# Patient Record
Sex: FEMALE | Race: BLACK OR AFRICAN AMERICAN | NOT HISPANIC OR LATINO | Employment: FULL TIME | URBAN - METROPOLITAN AREA
[De-identification: names, ages, dates, MRNs, and addresses within clinical notes are randomized per-mention and may not be internally consistent; named-entity substitution may affect disease eponyms.]

---

## 2023-08-24 PROBLEM — J30.9 ALLERGIC RHINITIS: Status: ACTIVE | Noted: 2023-08-24

## 2023-08-24 PROBLEM — R63.4 WEIGHT LOSS, UNINTENTIONAL: Status: ACTIVE | Noted: 2023-08-24

## 2023-08-24 PROBLEM — J33.9 NASAL POLYP: Status: ACTIVE | Noted: 2023-08-24

## 2023-08-24 PROBLEM — O99.013 ANEMIA DURING PREGNANCY IN THIRD TRIMESTER (HHS-HCC): Status: ACTIVE | Noted: 2023-08-24

## 2023-08-24 PROBLEM — Z22.330 GBS CARRIER: Status: ACTIVE | Noted: 2023-08-24

## 2023-08-24 PROBLEM — R87.619 ABNORMAL PAP SMEAR OF CERVIX: Status: ACTIVE | Noted: 2023-08-24

## 2023-08-24 PROBLEM — K21.9 GERD (GASTROESOPHAGEAL REFLUX DISEASE): Status: ACTIVE | Noted: 2023-08-24

## 2023-08-24 RX ORDER — OXYCODONE AND ACETAMINOPHEN 5; 325 MG/1; MG/1
1 TABLET ORAL EVERY 6 HOURS PRN
COMMUNITY

## 2023-08-24 RX ORDER — NORETHINDRONE 0.35 MG/1
1 TABLET ORAL DAILY
COMMUNITY

## 2023-08-24 RX ORDER — NORETHINDRONE 0.35 MG/1
1 TABLET ORAL DAILY
COMMUNITY
Start: 2022-06-02

## 2023-10-03 ENCOUNTER — APPOINTMENT (OUTPATIENT)
Dept: PHYSICAL THERAPY | Facility: CLINIC | Age: 33
End: 2023-10-03
Payer: COMMERCIAL

## 2023-10-04 ENCOUNTER — DOCUMENTATION (OUTPATIENT)
Dept: PHYSICAL THERAPY | Facility: CLINIC | Age: 33
End: 2023-10-04
Payer: COMMERCIAL

## 2023-10-04 NOTE — PROGRESS NOTES
Physical Therapy                 Therapy Communication Note    Patient Name: Racheal Stokes  MRN: [id number]  Today's Date: 10/4/2023     Discipline: Physical Therapy    Missed Visit Reason:      Missed Time: Patient Cancel appointment 10/03/2023

## 2023-10-10 ENCOUNTER — DOCUMENTATION (OUTPATIENT)
Dept: PHYSICAL THERAPY | Facility: CLINIC | Age: 33
End: 2023-10-10
Payer: COMMERCIAL

## 2023-10-10 NOTE — PROGRESS NOTES
Physical Therapy                 Therapy Communication Note    Patient Name: Racheal Stokes  MRN: [id number]  Today's Date: 10/10/2023     Discipline: Physical Therapy    Missed Time: No Show    Comment: Patient no-showed appointment. Attempted to reach patient, left voicemail after missed visit. Patients insurance authorization expires 10/13/2023.

## 2024-01-12 ENCOUNTER — DOCUMENTATION (OUTPATIENT)
Dept: PHYSICAL THERAPY | Facility: CLINIC | Age: 34
End: 2024-01-12
Payer: COMMERCIAL

## 2024-01-12 NOTE — PROGRESS NOTES
Physical Therapy    Discharge Summary    Name: Racheal Stokes  MRN: [id number]  : 1990  Date: 24    Discharge Summary: PT    Discharge Information: Date of discharge 2024, Date of last visit 2023, Date of evaluation 2023, Number of attended visits 6, Referred by Divya IVEY, and Referred for R foot fracture    Discharge Status: Patient cancelled, no showed follow up visits prior to end of insurance authorization approval date. Insurance authorization for physical therapy ended 10/13/2023. See last daily note for last known status.    Rehab Discharge Reason: Failed to schedule and/or keep follow-up appointment(s)